# Patient Record
Sex: FEMALE | Race: BLACK OR AFRICAN AMERICAN | Employment: FULL TIME | URBAN - METROPOLITAN AREA
[De-identification: names, ages, dates, MRNs, and addresses within clinical notes are randomized per-mention and may not be internally consistent; named-entity substitution may affect disease eponyms.]

---

## 2017-12-26 ENCOUNTER — HOSPITAL ENCOUNTER (EMERGENCY)
Age: 32
Discharge: HOME | End: 2017-12-26
Attending: EMERGENCY MEDICINE
Payer: COMMERCIAL

## 2017-12-26 ENCOUNTER — APPOINTMENT (OUTPATIENT)
Dept: GENERAL RADIOLOGY | Age: 32
End: 2017-12-26
Attending: PHYSICIAN ASSISTANT
Payer: COMMERCIAL

## 2017-12-26 VITALS
TEMPERATURE: 99.7 F | DIASTOLIC BLOOD PRESSURE: 57 MMHG | HEART RATE: 103 BPM | OXYGEN SATURATION: 99 % | RESPIRATION RATE: 18 BRPM | BODY MASS INDEX: 23.62 KG/M2 | WEIGHT: 165 LBS | HEIGHT: 70 IN | SYSTOLIC BLOOD PRESSURE: 105 MMHG

## 2017-12-26 DIAGNOSIS — J06.9 VIRAL URI WITH COUGH: Primary | ICD-10-CM

## 2017-12-26 DIAGNOSIS — L02.419 AXILLARY ABSCESS: ICD-10-CM

## 2017-12-26 LAB
APPEARANCE UR: CLEAR
BACTERIA URNS QL MICRO: ABNORMAL /HPF
BILIRUB UR QL: NEGATIVE
COLOR UR: YELLOW
EPITH CASTS URNS QL MICRO: ABNORMAL /LPF (ref 0–5)
FLUAV AG NPH QL IA: NEGATIVE
FLUBV AG NOSE QL IA: NEGATIVE
GLUCOSE UR STRIP.AUTO-MCNC: NEGATIVE MG/DL
HCG UR QL: NEGATIVE
HGB UR QL STRIP: NEGATIVE
KETONES UR QL STRIP.AUTO: ABNORMAL MG/DL
LEUKOCYTE ESTERASE UR QL STRIP.AUTO: ABNORMAL
NITRITE UR QL STRIP.AUTO: NEGATIVE
PH UR STRIP: 7 [PH] (ref 5–8)
PROT UR STRIP-MCNC: NEGATIVE MG/DL
RBC #/AREA URNS HPF: ABNORMAL /HPF (ref 0–5)
SP GR UR REFRACTOMETRY: 1.02 (ref 1–1.03)
UROBILINOGEN UR QL STRIP.AUTO: 0.2 EU/DL (ref 0.2–1)
WBC URNS QL MICRO: ABNORMAL /HPF (ref 0–4)

## 2017-12-26 PROCEDURE — 81001 URINALYSIS AUTO W/SCOPE: CPT | Performed by: PHYSICIAN ASSISTANT

## 2017-12-26 PROCEDURE — 96372 THER/PROPH/DIAG INJ SC/IM: CPT

## 2017-12-26 PROCEDURE — 71020 XR CHEST PA LAT: CPT

## 2017-12-26 PROCEDURE — 87804 INFLUENZA ASSAY W/OPTIC: CPT | Performed by: PHYSICIAN ASSISTANT

## 2017-12-26 PROCEDURE — 74011250636 HC RX REV CODE- 250/636: Performed by: PHYSICIAN ASSISTANT

## 2017-12-26 PROCEDURE — 99283 EMERGENCY DEPT VISIT LOW MDM: CPT

## 2017-12-26 PROCEDURE — 81025 URINE PREGNANCY TEST: CPT | Performed by: PHYSICIAN ASSISTANT

## 2017-12-26 RX ORDER — IBUPROFEN 800 MG/1
800 TABLET ORAL
Qty: 20 TAB | Refills: 0 | Status: SHIPPED | OUTPATIENT
Start: 2017-12-26 | End: 2018-01-02

## 2017-12-26 RX ORDER — CODEINE PHOSPHATE AND GUAIFENESIN 10; 100 MG/5ML; MG/5ML
5 SOLUTION ORAL
Qty: 120 ML | Refills: 0 | Status: SHIPPED | OUTPATIENT
Start: 2017-12-26

## 2017-12-26 RX ORDER — SULFAMETHOXAZOLE AND TRIMETHOPRIM 800; 160 MG/1; MG/1
1 TABLET ORAL 2 TIMES DAILY
Qty: 14 TAB | Refills: 0 | Status: SHIPPED | OUTPATIENT
Start: 2017-12-26 | End: 2018-01-02

## 2017-12-26 RX ORDER — KETOROLAC TROMETHAMINE 30 MG/ML
60 INJECTION, SOLUTION INTRAMUSCULAR; INTRAVENOUS
Status: COMPLETED | OUTPATIENT
Start: 2017-12-26 | End: 2017-12-26

## 2017-12-26 RX ORDER — CEPHALEXIN 500 MG/1
500 CAPSULE ORAL 4 TIMES DAILY
Qty: 28 CAP | Refills: 0 | Status: SHIPPED | OUTPATIENT
Start: 2017-12-26 | End: 2018-01-02

## 2017-12-26 RX ADMIN — KETOROLAC TROMETHAMINE 60 MG: 30 INJECTION, SOLUTION INTRAMUSCULAR at 18:38

## 2017-12-26 NOTE — ED PROVIDER NOTES
EMERGENCY DEPARTMENT HISTORY AND PHYSICAL EXAM    5:51 PM      Date: 12/26/2017  Patient Name: Zack Goldman    History of Presenting Illness     Chief Complaint   Patient presents with    Nasal Congestion    Cough    Generalized Body Aches    Abscess       History Provided By: Patient    Chief Complaint: URI, abscess  Duration: 1 Days  Timing:  Acute  Location: URI, Abd, musculoskeletal  Quality: Aching  Severity: 8 out of 10  Modifying Factors:  Associated Symptoms: denies any other associated signs or symptoms      Additional History (Context):Heather Black is a 33 y.o. female who presents to the emergency department for evaluation of body aches, chills, fever, back pain, nasal congestion, rhinorrhea, cough, HA x 1 day. She is also complaining of lower abdominal pain and painful abscess to right axilla. All symptoms began at same time yesterday. Pt denies any dizziness or light headedness, CP or discomfort, SOB, n/v/d/c, diaphoresis, melena/hematochezia, dysuria, hematuria, frequency, vaginal discharge, vaginal odor, new sexual partners, focal weakness/numbness/tingling, or rash. Patient has no other complaints at this time. PCP:  None    Past History     Past Medical History:  No past medical history on file. Past Surgical History:  No past surgical history on file. Family History:  No family history on file. Social History:  Social History   Substance Use Topics    Smoking status: Not on file    Smokeless tobacco: Not on file    Alcohol use Not on file       Allergies:  No Known Allergies      Review of Systems       Review of Systems   Constitutional: Positive for chills and fever. HENT: Positive for congestion and rhinorrhea. Negative for sore throat. Respiratory: Positive for cough. Negative for shortness of breath. Cardiovascular: Negative for chest pain. Gastrointestinal: Positive for abdominal pain.  Negative for blood in stool, constipation, diarrhea, nausea and vomiting. Genitourinary: Negative for dysuria, frequency and hematuria. Musculoskeletal: Positive for back pain and myalgias. Skin: Negative for rash and wound. Neurological: Positive for headaches. Negative for dizziness. Physical Exam     Visit Vitals    /57 (BP 1 Location: Left arm, BP Patient Position: At rest)    Pulse (!) 103    Temp 99.7 °F (37.6 °C)    Resp 18    Ht 5' 10\" (1.778 m)    Wt 74.8 kg (165 lb)    LMP 12/02/2017 (Approximate)    SpO2 99%    BMI 23.68 kg/m2       Physical Exam   Constitutional: She is oriented to person, place, and time. She appears well-developed and well-nourished. No distress. HENT:   Head: Normocephalic and atraumatic. Nose: Mucosal edema and rhinorrhea present. Mouth/Throat: Oropharynx is clear and moist. No oropharyngeal exudate. Eyes: Conjunctivae are normal. Right eye exhibits no discharge. Left eye exhibits no discharge. Neck: Normal range of motion. Neck supple. No thyromegaly present. Cardiovascular: Regular rhythm and normal heart sounds. Exam reveals no gallop and no friction rub. No murmur heard. Pulmonary/Chest: Effort normal and breath sounds normal. No respiratory distress. She has no wheezes. She has no rales. She exhibits no tenderness. Lungs CTAB   Abdominal: Soft. Bowel sounds are normal. She exhibits no distension. There is tenderness. There is no rebound and no guarding. Tenderness to deep palpation noted to all regions. No rebound, rigidity, guarding, distention, or mass noted. (-) Beavers's sign. (-) Rovsing's sign. Normoactive BS all four quadrants. Musculoskeletal: She exhibits no edema or deformity. 1x1.5cm indurated region noted to right axilla without fluctuance. No surrounding erythema   Lymphadenopathy:     She has no cervical adenopathy. Neurological: She is alert and oriented to person, place, and time. She has normal reflexes. No cranial nerve deficit. Skin: Skin is warm and dry.  No rash noted. She is not diaphoretic. Psychiatric: She has a normal mood and affect. Nursing note and vitals reviewed. Diagnostic Study Results     Labs -  Recent Results (from the past 12 hour(s))   INFLUENZA A & B AG (RAPID TEST)    Collection Time: 12/26/17  5:48 PM   Result Value Ref Range    Influenza A Antigen NEGATIVE  NEG      Influenza B Antigen NEGATIVE  NEG     URINALYSIS W/ RFLX MICROSCOPIC    Collection Time: 12/26/17  6:00 PM   Result Value Ref Range    Color YELLOW      Appearance CLEAR      Specific gravity 1.016 1.005 - 1.030      pH (UA) 7.0 5.0 - 8.0      Protein NEGATIVE  NEG mg/dL    Glucose NEGATIVE  NEG mg/dL    Ketone TRACE (A) NEG mg/dL    Bilirubin NEGATIVE  NEG      Blood NEGATIVE  NEG      Urobilinogen 0.2 0.2 - 1.0 EU/dL    Nitrites NEGATIVE  NEG      Leukocyte Esterase TRACE (A) NEG     HCG URINE, QL    Collection Time: 12/26/17  6:00 PM   Result Value Ref Range    HCG urine, Ql. NEGATIVE  NEG     URINE MICROSCOPIC ONLY    Collection Time: 12/26/17  6:00 PM   Result Value Ref Range    WBC 0 to 3 0 - 4 /hpf    RBC NONE 0 - 5 /hpf    Epithelial cells FEW 0 - 5 /lpf    Bacteria 1+ (A) NEG /hpf       Radiologic Studies -   No results found. Medical Decision Making   I am the first provider for this patient. I reviewed the vital signs, available nursing notes, past medical history, past surgical history, family history and social history. Vital Signs-Reviewed the patient's vital signs.     Pulse Oximetry Analysis -  99% on room air (Interpretation)    Records Reviewed: Nursing Notes, Old Medical Records, Previous Radiology Studies and Previous Laboratory Studies (Time of Review: 5:51 PM)    ED Course: Progress Notes, Reevaluation, and Consults:    Provider Notes (Medical Decision Making):   Differential Diagnosis:  influenza, mononucleosis, acute bronchitis, URI, streptococcal pharyngitis, pertussis, pneumonia, asthma exacerbation, allergic rhinitis      Plan:  Pt presents ambulatory in NAD, mildly tachycardic with otherwise unremarkable vitals. CXR negative. Flu negative. UA and hcg negative. Likely viral etiology. Abscess is too small to I&D and there is no fluctuance. Will treat with abx and robitussin AC. At this time, patient is stable and appropriate for discharge home. Patient demonstrates understanding of current diagnoses and is in agreement with the treatment plan. They are advised that while the likelihood of serious underlying condition is low at this point given the evaluation performed today, we cannot fully rule it out. They are advised to immediately return with any new symptoms or worsening of current condition. All questions have been answered. Patient is given educational material regarding their diagnoses, including danger symptoms and when to return to the ED. Diagnosis     Clinical Impression:   1. Viral URI with cough    2. Axillary abscess        Disposition: IN Home    Follow-up Information     Follow up With Details Comments Alisa Call in 2 days to establish primary care [facility] EMERGENCY DEPT Go to As needed, If symptoms worsen [id number] [facility]  [phone]           Patient's Medications   Start Taking    CEPHALEXIN (KEFLEX) 500 MG CAPSULE    Take 1 Cap by mouth four (4) times daily for 7 days. GUAIFENESIN-CODEINE (ROBITUSSIN AC) 100-10 MG/5 ML SOLUTION    Take 5 mL by mouth nightly as needed for Cough. Max Daily Amount: 5 mL. IBUPROFEN (MOTRIN) 800 MG TABLET    Take 1 Tab by mouth every six (6) hours as needed for Pain for up to 7 days. TRIMETHOPRIM-SULFAMETHOXAZOLE (BACTRIM DS) 160-800 MG PER TABLET    Take 1 Tab by mouth two (2) times a day for 7 days.    Continue Taking    No medications on file   These Medications have changed    No medications on file   Stop Taking    No medications on file     _______________________________

## 2017-12-27 NOTE — DISCHARGE INSTRUCTIONS
Skin Abscess: Care Instructions  Your Care Instructions    A skin abscess is a bacterial infection that forms a pocket of pus. A boil is a kind of skin abscess. The doctor may have cut an opening in the abscess so that the pus can drain out. You may have gauze in the cut so that the abscess will stay open and keep draining. You may need antibiotics. You will need to follow up with your doctor to make sure the infection has gone away. The doctor has checked you carefully, but problems can develop later. If you notice any problems or new symptoms, get medical treatment right away. Follow-up care is a key part of your treatment and safety. Be sure to make and go to all appointments, and call your doctor if you are having problems. It's also a good idea to know your test results and keep a list of the medicines you take. How can you care for yourself at home? · Apply warm and dry compresses, a heating pad set on low, or a hot water bottle 3 or 4 times a day for pain. Keep a cloth between the heat source and your skin. · If your doctor prescribed antibiotics, take them as directed. Do not stop taking them just because you feel better. You need to take the full course of antibiotics. · Take pain medicines exactly as directed. ¨ If the doctor gave you a prescription medicine for pain, take it as prescribed. ¨ If you are not taking a prescription pain medicine, ask your doctor if you can take an over-the-counter medicine. · Keep your bandage clean and dry. Change the bandage whenever it gets wet or dirty, or at least one time a day. · If the abscess was packed with gauze:  ¨ Keep follow-up appointments to have the gauze changed or removed. If the doctor instructed you to remove the gauze, gently pull out all of the gauze when your doctor tells you to. ¨ After the gauze is removed, soak the area in warm water for 15 to 20 minutes 2 times a day, until the wound closes. When should you call for help?   Call your doctor now or seek immediate medical care if:  ? · You have signs of worsening infection, such as:  ¨ Increased pain, swelling, warmth, or redness. ¨ Red streaks leading from the infected skin. ¨ Pus draining from the wound. ¨ A fever. ? Watch closely for changes in your health, and be sure to contact your doctor if:  ? · You do not get better as expected. Where can you learn more? Go to http://cliff-raisa.info/. Enter L294 in the search box to learn more about \"Skin Abscess: Care Instructions. \"  Current as of: October 13, 2016  Content Version: 11.4  © 0401-6339 [employer]. Care instructions adapted under license by [employer] (which disclaims liability or warranty for this information). If you have questions about a medical condition or this instruction, always ask your healthcare professional. [place] [address] any warranty or liability for your use of this information. Viral Respiratory Infection: Care Instructions  Your Care Instructions    Viruses are very small organisms. They grow in number after they enter your body. There are many types that cause different illnesses, such as colds and the mumps. The symptoms of a viral respiratory infection often start quickly. They include a fever, sore throat, and runny nose. You may also just not feel well. Or you may not want to eat much. Most viral respiratory infections are not serious. They usually get better with time and self-care. Antibiotics are not used to treat a viral infection. That's because antibiotics will not help cure a viral illness. In some cases, antiviral medicine can help your body fight a serious viral infection. Follow-up care is a key part of your treatment and safety. Be sure to make and go to all appointments, and call your doctor if you are having problems. It's also a good idea to know your test results and keep a list of the medicines you take.   How can you care for yourself at home? · Rest as much as possible until you feel better. · Be safe with medicines. Take your medicine exactly as prescribed. Call your doctor if you think you are having a problem with your medicine. You will get more details on the specific medicine your doctor prescribes. · Take an over-the-counter pain medicine, such as acetaminophen (Tylenol), ibuprofen (Advil, Motrin), or naproxen (Aleve), as needed for pain and fever. Read and follow all instructions on the label. Do not give aspirin to anyone younger than 20. It has been linked to Reye syndrome, a serious illness. · Drink plenty of fluids, enough so that your urine is light yellow or clear like water. Hot fluids, such as tea or soup, may help relieve congestion in your nose and throat. If you have kidney, heart, or liver disease and have to limit fluids, talk with your doctor before you increase the amount of fluids you drink. · Try to clear mucus from your lungs by breathing deeply and coughing. · Gargle with warm salt water once an hour. This can help reduce swelling and throat pain. Use 1 teaspoon of salt mixed in 1 cup of warm water. · Do not smoke or allow others to smoke around you. If you need help quitting, talk to your doctor about stop-smoking programs and medicines. These can increase your chances of quitting for good. To avoid spreading the virus  · Cough or sneeze into a tissue. Then throw the tissue away. · If you don't have a tissue, use your hand to cover your cough or sneeze. Then clean your hand. You can also cough into your sleeve. · Wash your hands often. Use soap and warm water. Wash for 15 to 20 seconds each time. · If you don't have soap and water near you, you can clean your hands with alcohol wipes or gel. When should you call for help? Call your doctor now or seek immediate medical care if:  ? · You have a new or higher fever. ? · Your fever lasts more than 48 hours.    ? · You have trouble breathing. ? · You have a fever with a stiff neck or a severe headache. ? · You are sensitive to light. ? · You feel very sleepy or confused. ? Watch closely for changes in your health, and be sure to contact your doctor if:  ? · You do not get better as expected. Where can you learn more? Go to http://cliff-raisa.info/. Enter G287 in the search box to learn more about \"Viral Respiratory Infection: Care Instructions. \"  Current as of: May 12, 2017  Content Version: 11.4  © 3085-5133 [employer]. Care instructions adapted under license by [employer] (which disclaims liability or warranty for this information). If you have questions about a medical condition or this instruction, always ask your healthcare professional. [address] any warranty or liability for your use of this information.

## 2017-12-27 NOTE — ED NOTES
Trudy Hung is a 33 y.o. female that was discharged in stable. Pt was accompanied by sister . Pt is not driving. The patients diagnosis, condition and treatment were explained to  patient and aftercare instructions were given. The patient verbalized understanding. Patient armband removed and shredded.

## 2018-09-28 ENCOUNTER — HOSPITAL ENCOUNTER (INPATIENT)
Dept: HOSPITAL 74 - JLDR | Age: 33
LOS: 7 days | Discharge: HOME | End: 2018-10-05
Attending: OBSTETRICS & GYNECOLOGY | Admitting: OBSTETRICS & GYNECOLOGY
Payer: COMMERCIAL

## 2018-09-28 VITALS — BODY MASS INDEX: 27.8 KG/M2

## 2018-09-28 DIAGNOSIS — D64.9: ICD-10-CM

## 2018-09-28 DIAGNOSIS — O99.013: ICD-10-CM

## 2018-09-28 DIAGNOSIS — D25.9: ICD-10-CM

## 2018-09-28 DIAGNOSIS — O32.0XX0: ICD-10-CM

## 2018-09-28 DIAGNOSIS — O34.83: ICD-10-CM

## 2018-09-28 DIAGNOSIS — N83.8: ICD-10-CM

## 2018-09-28 DIAGNOSIS — Z3A.39: ICD-10-CM

## 2018-09-28 DIAGNOSIS — O34.13: ICD-10-CM

## 2018-10-01 LAB
ARTERIAL BLOOD GAS PCO2: 53.5 MMHG (ref 35–45)
BASE EXCESS BLDA CALC-SCNC: -5.4 MEQ/L (ref -2–2)
PH BLDV: 7.23 [PH] (ref 7.32–7.42)
PO2 BLDA: 18.1 MMHG (ref 80–100)
SAO2 % BLDA: 36.1 % (ref 90–98.9)
VENOUS PC02: 54 MMHG (ref 38–52)
VENOUS PO2: 18.5 MMHG (ref 28–48)

## 2018-10-01 PROCEDURE — 0UB90ZZ EXCISION OF UTERUS, OPEN APPROACH: ICD-10-PCS | Performed by: RADIOLOGY

## 2018-10-01 RX ADMIN — CEFAZOLIN SCH MLS/HR: 1 INJECTION, POWDER, FOR SOLUTION INTRAVENOUS at 18:01

## 2018-10-01 RX ADMIN — IBUPROFEN PRN MG: 800 INJECTION INTRAVENOUS at 14:43

## 2018-10-01 NOTE — HP
Past Medical History





- Primary Care Physician


PCP:: Mynor Feldman





- Admission


Chief Complaint: 34yo P0 with pregnancy at EGA 39w3d and large fibroids with 

unstable fetal lie admitted for preimary C/S.


History of Present Illness: 





Pt with multiple large fibroids, including a large FLOWER anterior fibroid, 

preventing fetal descent.


Prior h/o anemia- corrected.


H/o Endometrioma and endometriosis


History Source: Patient, Medical Record


Limitations to Obtaining History: No Limitations





- Past Medical History


CNS: No: Alzheimer's, CVA, Dementia, Migraine, Multiple Sclerosis, Peripheral 

Neuropathy, Parkinson's, Seizure, Syncope, TIA, Vertigo, Other


Cardiovascular: No: AFIB, Aneurysm, Aortic Insufficiency, Aortic Stenosis, CAD, 

CHF, Deep Vein Thrombosis, HTN, Hyperlipdemia, MI, Mitral Insufficiency, Mitral 

Stenosis, Murmur, Pulmonary Hypertension, Other


Pulmonary: No: Asthma, Bronchitis, Cancer, COPD, O2 Dependent, Pneumonia, 

Previously Intubated, Pulmonary Embolus, Pulmonary Fibrosis, Sleep Apnea, Other


Gastrointestinal: No: Ascites, Cancer, Constipation, Crohn's Disease, 

Diverticulitis, Diverticulosis, Esophageal Varices, Gastritis, GERD, GI Bleed, 

Hemorrhoids, Hiatal Hernia, Inflamatory Bowel Disease, Irritable Bowel Disease, 

Pancreatitis, Peptic Ulcer Disease, Ulcerative Colitis, Other


Hepatobiliary: No: Cirrhosis, Cholelithiasis, Cholecystitis, Choledocholithiasis

, Hepatitis A, Hepatitis B, Hepatitis C, Other


Renal/: No: Renal Failure, Renal Inusuff, BPH, Cancer, Hematuria, Hemodialysis

, Neurogenic Bladder, Renal Calculi, UTI, Other


Reproductive: Yes: Fibroids


...Para: 0


... Weeks Gestation by Dates: 39.3


Heme/Onc: Yes: Anemia


Infectious Disease: No: AIDS, C-Diff, Herpes Zoster, HIV, MRSA, STD's, 

Tuberculosis, VREF, Other


Psych: No: Addictions, Anxiety, Bipolar, Depression, Panic, Psychosis, 

Schizophrenia, Other


Musculoskeletal: No: Bursitis, Chronic low back pain, Hemiparesis, Hemiplegia, 

Osteoarthritis, Paraplegia, Other


Rheumatology: No: Fibromyalgia, Gout, Lupus, Rheumatoid Arthritis, Sarcoidosis, 

Vasculitis, Other


ENT: No: Allergic Rhinitis, Sinusitis, Other


Endocrine: No: Davis's Disease, Cushing's Disease, Diabetes Insipidus, 

Diabetes Mellitus, Hyperparathyroidism, Hyperthyroidism, Hypothyroidism, 

Osteopenia, SIADH, Other


Dermatology: No: Basal Cell, Cellulitis, Eczema, Melanoma, Psoriasis, Squamous 

Cell, Other





- Past Surgical History


Hx Myomectomy: No


Hx Transabdominal Cerclage: No


Additional Surgical History: Laparoscopic left ovarian cystectomy, ablation of 

endometrium, MERRY (3/2015).  Breast Bx





- Smoking History


Smoking history: Never smoked





- Alcohol/Substance Use


Hx Alcohol Use: No


History of Substance Use: reports: None





- Social History


Usual Living Arrangement: Yes: Alone


ADL: Independent


Occupation: Teacher


History of Recent Travel: No





Home Medications





- Allergies


Allergies/Adverse Reactions: 


 Allergies











Allergy/AdvReac Type Severity Reaction Status Date / Time


 


soy Allergy Severe THROAT ITCH Verified 07/05/18 10:39


 


NUTS Allergy Severe SWELLING, Uncoded 07/05/18 10:39





   DIFF  





   BREATHING  














- Home Medications


Home Medications: 


Ambulatory Orders





Iron Sucrose Injection [Venofer Injection -] 1 infus.bot IVPB WEEKLY 07/05/18 


Prenatal Vitamins (Sjr) - 1 tab PO DAILY 07/05/18 











Family Disease History





- Family Disease History


Family History: Unremarkable





Review of Systems





- Review of Systems


Constitutional: reports: No Symptoms


Eyes: reports: No Symptoms


HENT: reports: No Symptoms


Neck: reports: No Symptoms


Cardiovascular: reports: No Symptoms


Respiratory: reports: No Symptoms


Gastrointestinal: reports: No Symptoms


Genitourinary: reports: No Symptoms


Breasts: reports: No Symptoms Reported


Musculoskeletal: reports: No Symptoms


Integumentary: reports: No Symptoms


Neurological: reports: No Symptoms


Endocrine: reports: No Symptoms


Hematology/Lymphatic: reports: No Symptoms


Pain Intensity: 0





Physical Exam - Maternity


Constitutional: Yes: Well Nourished, No Distress, Calm


Eyes: Yes: WNL, Conjunctiva Clear


HENT: Yes: WNL, Atraumatic, Normocephalic


Neck: Yes: WNL, Supple, Trachea Midline


Cardiovascular: Yes: WNL, Regular Rate and Rhythm


Lungs: Clear to auscultation, Normal air movement





- Abdominal Exam/OB


Fundal Height: 40


Number of Fetuses: Single


Fetal Presentation: Vertex, Oblique


Contractions: No


Fetal Heart Rate (range): 130


Fetal Heart Rate Location: Midline


Category: I


Accelerations: Non-Uniform


Decelerations: None





- Vaginal Exam/OB


Vaginal Bleediing: No


Speculum Exam: No





- Physical Exam


Musculoskeletal: Yes: WNL


Extremities: Yes: WNL


Edema: No


Integumentary: Yes: WNL


Deep Tendon Reflex Grade: Normal +2


...Motor Strength: WNL


Psychiatric: Yes: WNL, Alert, Oriented





Hemorrhage Risk Assessment





- Risk Factors


Medium Risk Factors: Yes: Large myomas


High Risk Factors: Yes: None


Risk Score: 1


Risk Level: Medium Risk





Imaging





- Results


Ultrasound: Report Reviewed, Image Reviewed





Assessment/Plan





34yo P0 with pregnancy at EGA 39w3d and large fibroids with unstable fetal lie 

admitted for preimary C/S. We discussed the risks and benefits of C/S at length

, including but not limited to scarring, pain, bleeding, infection, injury to 

underlying organs and structures, need for additional surgery to repair/treat 

any problems or complications, fetal complications/injuries, etc. We 

specifically reviewed the risks of alternative uterine incisions, hemorrhage, 

hysterectomy, blood transfusion, etc. The pt verbalized her understanding and 

requested to proceed with surgery. The pt is aware that all surgeries have 

risks and no guarantees can be provided.

## 2018-10-01 NOTE — OP
Operative Note





- Note:


Operative Date: 10/01/18


Pre-Operative Diagnosis: Pregnancy at EGA 39w2d.  Large fibroid uterus.  

Unstable fetal lie


Operation: Primary high transverse C/S.  Myomectomy.  Excision of Left 

paratubal cyst


Findings: 





1. Bulky gravid uterus with multiple fibroids


2. Large FLOWER anterior fibroid (replacing FLOWER)


3. Several smaller uterine fibroids throughout


4. Left paratubal/para-ovarian cyst


5. Ovaries difficult to see but appeared normal.


Post-Operative Diagnosis: Same as Pre-op


Surgeon: Mynor Feldman


Assistant: Jersey Sanchez


Anesthesiologist/CRNA: Montserrat Roland


Anesthesia: Spinal, Epidural


Specimens Removed: Placenta.  Uterine myoma.  left paratubal cyst


Estimated Blood Loss (mls): 1,000


Drains & Tubes with Location: [place] Cath


Drains, Volume Out (mls): 400


Blood Volume Replaced (mls): 0


Fluid Volume Replaced (mls): 1,000


Operative Report Dictated: Yes

## 2018-10-01 NOTE — OP
DATE OF OPERATION:  10/01/2018

 

PREOPERATIVE DIAGNOSIS:  Pregnancy at estimated gestational age of 39 weeks 2 
days,

large fibroid uterus, unstable fetal lie.

 

POSTOPERATIVE DIAGNOSIS:  Pregnancy at estimated gestational age of 39 weeks 2 
days,

large fibroid uterus, unstable fetal lie, delivered.

 

PROCEDURE:  Primary high transverse  section, myomectomy, excision of 
left

paratubal/paraovarian cyst.  

 

SURGEON:  Mynor Feldman MD 

 

ASSISTANT:  Jersey Sanchez MD 

 

ANESTHESIOLOGIST:  Montserrat Roland MD 

 

ANESTHESIA:  Spinal epidural.

 

COMPLICATIONS:  None.

 

ESTIMATED BLOOD LOSS:  1000 mL.

 

URINE OUTPUT:  400 mL of clear urine at the end of the procedure.

 

IV FLUIDS:  1000 mL.

 

PATHOLOGY:  Placenta, uterine myoma, left paratubal/paraovarian cyst.

 

FINDINGS:  Examination prior to  section revealed an enlarged, bulky, 
gravid

uterus with multiple fibroids.  A large fibroid was palpable on a vaginal exam 
on the

lower uterine segment anteriorly.  Fetal head was not palpable on the vaginal 
exam. 

On Leopold's maneuver, the fetal head was noted to be on the left maternal side 
in

the left lower quadrant.  During surgery, a bulky, gravid uterus was noted with

multiple fibroids.  There was a large anterior fibroid in the lower uterine 
segment

underneath the bladder completely replacing and displacing lower uterine 
segment. 

There were multiple small uterine fibroids throughout the uterus.  Live  
was

noted to be in oblique presentation with the head maternal left delivered from 
vertex

presentation.  No meconium in amniotic fluid.  Apgars 9/9.  The ovaries were

difficult to visualize, however, appeared to be within normal limits.

 

DESCRIPTION OF PROCEDURE:  The patient was met preoperatively.  Risks, benefits
, and

alternatives of surgery were reviewed in detail.  All questions were answered.  
The

patient was then brought to the OR with the IV running.  She was placed on the

surgical table in a sitting position.  Epidural anesthesia was achieved without

difficulty.  The patient was then placed in a supine position with a leftward 
tilt. 

The patient was then examined under anesthesia with the findings as described 
above. 

A Mendoza catheter was introduced inside the bladder and left to drain to 
gravity.  The

patient was then prepped and draped in a usual sterile fashion.  A time-out 
procedure

was conducted as per standard protocol.  The surgeons then proceeded with the

operation.  A transverse lower abdominal incision was made with a knife.  The

incision was carried down to the level of the fascia.  Good hemostasis was

maintained.  The fascia was incised in the midline, and the incision was 
extended

bilaterally using Dutta scissors.  The decision was made to proceed with a 
Maylard

abdominal incision.  The rectus muscles were then transected using a cautery 
device. 

The inferior epigastric blood vessels were isolated, clamped, cut, and suture 
ligated

bilaterally with good hemostasis.  The peritoneum was identified and entered 
sharply.

 The peritoneal incision was then extended bilaterally using Metzenbaum 
scissors.  At

that point, the retractors were used to visualize the uterus.  The lower uterine

segment was replaced by a large anterior fibroid underneath the bladder.  The

decision was then made to proceed with the high transverse incision to avoid 
multiple

other fibroids in the uterus.  The uterine incision was made transversely with a

knife.  The incision was carried down to the amniotic cavity.  Once the uterine

cavity was entered, the uterine incision was extended transversely using bandage

scissors.  The baby was delivered from vertex presentation.  The mouth and nose 
were

suctioned.  The baby was delivered without complications.  The baby was crying

spontaneously.  The umbilical cord was clamped and cut, and the baby was handed 
to

the awaiting neonatologist.  The baby was delivered manually without 
complications. 

The uterine cavity was cleared of all clots and debris using laparotomy laps.  
The

uterine myometrium was then repaired using a 0 Biosyn suture in multiple 
layers. 

Once the uterine myometrium was repaired, good hemostasis was observed.  

In order to close the uterine incision, a 4-cm myoma had to be removed to allow 
for better 

approximation and hemostasis.  A myomectomy was performed without 
complications.The top

layer and uterine serosa were closed using a running Biosyn suture with good

hemostasis and approximation. The operative site was irrigated with copious

amounts of normal saline.  Normal saline was aspirated, and good hemostasis was

confirmed.  Examination of fallopian tubes revealed a left paratubal/paraovarian

cyst.  The cyst was excised with good hemostasis.  The ovaries were difficult to

visualize because they were displaced by multiple fibroids; however, the ovaries

appeared to be within normal limits.  Once again, the operative site was 
surveyed and

noted to have good hemostasis.  The abdominal peritoneum was then closed using 
a 2-0

chromic suture.  The fascia was then closed using a 0 Vicryl suture with good

hemostasis and approximation.  The subcutaneous tissues and Amanda fascia were

approximated using several interrupted 0 Vicryl sutures.  The skin was closed 
using a

4-0 Vicryl suture with a subcutaneous stitch.  Sponge, lap, and instrument 
counts

were correct.  The patient was tolerated the procedure well and was transferred 
to

the recovery room in stable condition and awake.

 

 

 

PASCUAL [id number]

DD: 10/01/2018 12:38

DT: 10/01/2018 20:22

Job #:  [id number]

MTDD

## 2018-10-02 LAB
BASOPHILS # BLD: 0.2 % (ref 0–2)
DEPRECATED RDW RBC AUTO: 14.6 % (ref 11.6–15.6)
EOSINOPHIL # BLD: 1.9 % (ref 0–4.5)
HCT VFR BLD CALC: 30.4 % (ref 32.4–45.2)
HGB BLD-MCNC: 10.2 GM/DL (ref 10.7–15.3)
LYMPHOCYTES # BLD: 7.7 % (ref 8–40)
MCH RBC QN AUTO: 30.7 PG (ref 25.7–33.7)
MCHC RBC AUTO-ENTMCNC: 33.4 G/DL (ref 32–36)
MCV RBC: 91.6 FL (ref 80–96)
MONOCYTES # BLD AUTO: 7 % (ref 3.8–10.2)
NEUTROPHILS # BLD: 83.2 % (ref 42.8–82.8)
PLATELET # BLD AUTO: 134 K/MM3 (ref 134–434)
PMV BLD: 9.2 FL (ref 7.5–11.1)
RBC # BLD AUTO: 3.32 M/MM3 (ref 3.6–5.2)
WBC # BLD AUTO: 9.5 K/MM3 (ref 4–10)

## 2018-10-02 RX ADMIN — CEFAZOLIN SCH MLS/HR: 1 INJECTION, POWDER, FOR SOLUTION INTRAVENOUS at 01:08

## 2018-10-02 RX ADMIN — SIMETHICONE CHEW TAB 80 MG PRN MG: 80 TABLET ORAL at 15:38

## 2018-10-02 RX ADMIN — CEFAZOLIN SCH MLS/HR: 1 INJECTION, POWDER, FOR SOLUTION INTRAVENOUS at 09:11

## 2018-10-02 RX ADMIN — IBUPROFEN PRN MG: 800 INJECTION INTRAVENOUS at 04:19

## 2018-10-02 RX ADMIN — Medication SCH TAB: at 10:00

## 2018-10-02 RX ADMIN — ACETAMINOPHEN PRN MG: 325 TABLET ORAL at 15:37

## 2018-10-02 RX ADMIN — IBUPROFEN PRN MG: 600 TABLET, FILM COATED ORAL at 15:38

## 2018-10-02 NOTE — PN
Progress Note, Physician


Chief Complaint: 





s/p c section under spinal anesthesia.  epidural catheter placed as well


History of Present Illness: 





post op day one. it was not clear whether the epidural was functioning properly 

during the surgery, had to give deep sedation at the end of the surgery.  





- Current Medication List


Current Medications: 


Active Medications





Acetaminophen (Tylenol -)  650 mg PO Q4H PRN


   PRN Reason: FEVER


Benzocaine (Americaine 20% Spray -)  1 spray TP PRN PRN


   PRN Reason: Pain - Topical


Bisacodyl (Dulcolax Suppository -)  10 mg RC PRN PRN


   PRN Reason: CONSTIPATION


Diphenhydramine HCl (Benadryl Injection -)  25 mg IVPUSH Q4H PRN


   PRN Reason: Pruritis


Parenteral Electrolytes (Plasma-Lyte 148 -)  1,000 mls @ 125 mls/hr IV ASDIR [facility]


   Last Admin: 10/01/18 08:30 Dose:  125 mls/hr


Cefazolin Sodium 1 gm/ (Dextrose)  50 mls @ 100 mls/hr IVPB Q8H-IV [facility]INI


   Stop: 10/02/18 10:29


   Last Admin: 10/02/18 01:08 Dose:  100 mls/hr


Oxytocin/Sodium Chloride (Normal Saline+20 Units Oxytocin -)  20 unit in 1,000 

mls @ 125 mls/hr IV ASDIR [facility]


Ibuprofen (Motrin -)  600 mg PO Q4H PRN


   PRN Reason: PAIN LEVEL 1 - 3


Ibuprofen (Caldolor Injection -)  800 mg IVPB Q8H PRN


   PRN Reason: PAIN LEVEL 1-5


   Last Admin: 10/02/18 04:19 Dose:  800 mg


Methylergonovine Maleate (Methergine Injection -)  0.2 mg IM Q4H PRN


   PRN Reason: Excessive Bleeding (L&D)


Ondansetron HCl (Zofran Injection)  4 mg IVPUSH Q6H PRN


   PRN Reason: NAUSEA AND/OR VOMITING


Ondansetron HCl (Zofran Injection)  4 mg IVPUSH Q4H PRN


   PRN Reason: NAUSEA


Oxycodone HCl (Roxicodone -)  5 mg PO Q4H PRN


   PRN Reason: PAIN LEVEL 4 - 6


Prenatal Multivit/Folic Acid/Iron (Prenatal Vitamins (Sjr) -)  1 tab PO DAILY 

[facility]


Senna/Docusate Sodium (Pericolace -)  2 tablet PO HS PRN


   PRN Reason: CONSTIPATION


Simethicone (Mylicon -)  80 mg PO Q4H PRN


   PRN Reason: GAS


Witch Hazel/Glycerin (Tucks Pads -)  1 pad TP PRN PRN


   PRN Reason: Pain - Topical











- Objective


Vital Signs: 


 Vital Signs











Temperature  98 F   10/02/18 06:00


 


Pulse Rate  87   10/02/18 06:00


 


Respiratory Rate  20   10/02/18 06:00


 


Blood Pressure  104/52 L  10/02/18 06:00


 


O2 Sat by Pulse Oximetry (%)  100   10/01/18 13:15











Constitutional: Yes: Well Nourished


Cardiovascular: Yes: WNL


Respiratory: Yes: WNL


Gastrointestinal: Yes: WNL


Labs: 


 CBC, BMP





 10/02/18 07:00 











Assessment/Plan





Complained of some mild nausea overnight, now resolved, pain controlled, 

ambulating, no adverse effect of anesthetic, dept of anesthesia will sign off 

case at this time

## 2018-10-02 NOTE — PN
Post Partum Progress Note





- Subjective


Subjective: 





Patient without acute complaints. 


Reports tolerating oral intake without nausea or vomiting. 


Ambulating without dizziness. 


Denies fevers or chills.


Pain well controlled with oral pain medication.


Not Passing flatus and did not void yet.


Post Partum Day: 1


Type of Delivery: Primary C/S


Vital Signs: 


 Vital Signs











Temperature  97.0 F L  10/02/18 10:00


 


Pulse Rate  88   10/02/18 10:00


 


Respiratory Rate  20   10/02/18 10:00


 


Blood Pressure  102/50 L  10/02/18 10:00


 


O2 Sat by Pulse Oximetry (%)  100   10/01/18 13:15











Breast Exam: Yes: Soft


Uterus: Yes: Fundus Firm, Fundus above umbilicus


Incision: Yes: Dressing dry and intact


Abdomen/GI: Yes: Abdomen soft


Lochia: Yes: Rubra


Lochia, amount: Small


Extremities: Yes: Calves non-tender





- Labs


Labs: 


 CBC











WBC  9.5 K/mm3 (4.0-10.0)   10/02/18  07:00    


 


RBC  3.32 M/mm3 (3.60-5.2)  L  10/02/18  07:00    


 


Hgb  10.2 GM/dL (10.7-15.3)  L  10/02/18  07:00    


 


Hct  30.4 % (32.4-45.2)  L D 10/02/18  07:00    


 


MCV  91.6 fl (80-96)   10/02/18  07:00    


 


MCH  30.7 pg (25.7-33.7)   10/02/18  07:00    


 


MCHC  33.4 g/dl (32.0-36.0)   10/02/18  07:00    


 


RDW  14.6 % (11.6-15.6)   10/02/18  07:00    


 


Plt Count  134 K/MM3 (134-434)   10/02/18  07:00    


 


MPV  9.2 fl (7.5-11.1)   10/02/18  07:00    


 


Absolute Neuts (auto)  7.9 K/mm3 (1.5-8.0)   10/02/18  07:00    


 


Neutrophils %  83.2 % (42.8-82.8)  H  10/02/18  07:00    


 


Lymphocytes %  7.7 % (8-40)  L D 10/02/18  07:00    


 


Monocytes %  7.0 % (3.8-10.2)   10/02/18  07:00    


 


Eosinophils %  1.9 % (0-4.5)  D 10/02/18  07:00    


 


Basophils %  0.2 % (0-2.0)   10/02/18  07:00    


 


Nucleated RBC %  0 % (0-0)   10/02/18  07:00    














Assessment/Plan





32yo P 1 now s/p high transverse c/s and myomectomy due to large fibroids, 

fetal malpresentation


1. Doing well 


2. VSS, Afebrile, no evidance of acute blood loss


3. Rh positive status, no rhogam indicated. 


4. Encourage ambulation 


5. [place] Insentive spirometer


6. Will follow voiding trial


7. Continue routine prenatal care

## 2018-10-03 RX ADMIN — Medication SCH TAB: at 10:50

## 2018-10-03 RX ADMIN — SIMETHICONE CHEW TAB 80 MG PRN MG: 80 TABLET ORAL at 08:01

## 2018-10-03 RX ADMIN — IBUPROFEN PRN MG: 600 TABLET, FILM COATED ORAL at 18:18

## 2018-10-03 RX ADMIN — IBUPROFEN PRN MG: 600 TABLET, FILM COATED ORAL at 08:01

## 2018-10-03 RX ADMIN — ACETAMINOPHEN PRN MG: 325 TABLET ORAL at 08:01

## 2018-10-03 RX ADMIN — ACETAMINOPHEN PRN MG: 325 TABLET ORAL at 18:17

## 2018-10-03 RX ADMIN — SIMETHICONE CHEW TAB 80 MG PRN MG: 80 TABLET ORAL at 18:17

## 2018-10-03 NOTE — PATH
Surgical Pathology Report



Patient Name:  DEEPIKA TOPETE

Accession #:  [id number]

[facility]. Rec. #:  [id number]                                                        

   /Age/Gender:  1985 (Age: 33) / F

Account:  [id number]                                                          

             Location: [facility] OBS/GYN

Taken:  10/1/2018

Received:  10/2/2018

Reported:  10/3/2018

Physicians:  Mynor Feldman M.D.

  



Specimen(s) Received

 PLACENTA 





Clinical History

, 39.4 weeks, history of HPV, large uterine fibroids

Previous surgery: Endometriosis with ablation, lysis of adhesions, left ovarian

cystectomy 3/2016, left knee surgery 2001







Final Diagnosis

PLACENTA,  SECTION:

553 G THIRD TRIMESTER PLACENTA WITH TRIVASCULAR UMBILICAL CORD AND FOCAL ACUTE

MILD CHORIOAMNIONITIS. 





***Electronically Signed***

Jelly Tobar M.D.





Gross Description

The specimen is received fresh labeled "placenta" is a 553 gram, 17 x 17 x 2 cm.

placenta with attached membranes and umbilical cord.  The attached membranes are

yellow-tinged, opaque and insert marginally.  The umbilical cord measures 35 cm

in length and averages 1.5 cm in diameter.  The cord inserts marginally.  No

true knots or strictures are identified. Cut surface of the umbilical cord

reveals 3 vessels. The fetal surface is grey-blue with minimal fibrin deposition

and appropriate caliber vessels.  The maternal surface is red-brown with focal

defects.  Sectioning reveals red-brown, spongy parenchyma.  No lesions are

identified.  Representative sections are submitted in three cassettes as

follows: 1- membrane rolls and umbilical cord; 2-3- full thickness sections of

placenta.

MLSZ/10/2/2018



sanml/10/2/2018

## 2018-10-03 NOTE — PN
Post Partum Progress Note





- Subjective


Subjective: 





No complaints.


Ambulating.


No nausea or vomiting. Passing flatus. 


Type of Delivery: Primary C/S


Vital Signs: 


 Vital Signs











Temperature  98.4 F   10/02/18 21:00


 


Pulse Rate  81   10/02/18 21:00


 


Respiratory Rate  20   10/02/18 21:00


 


Blood Pressure  112/58 L  10/02/18 21:00


 


O2 Sat by Pulse Oximetry (%)  100   10/01/18 13:15











Breast Exam: Yes: Soft


Uterus: Yes: Fundus Firm


Incision: Yes: Sutures intact


Abdomen/GI: Yes: Abdomen soft, Tolerating PO


Lochia: Yes: Rubra


Lochia, amount: Small


Extremities: Yes: Calves non-tender


Perineum: Yes: Intact


Activity: Ambulating





- Labs


Labs: 


 CBC











WBC  9.5 K/mm3 (4.0-10.0)   10/02/18  07:00    


 


RBC  3.32 M/mm3 (3.60-5.2)  L  10/02/18  07:00    


 


Hgb  10.2 GM/dL (10.7-15.3)  L  10/02/18  07:00    


 


Hct  30.4 % (32.4-45.2)  L D 10/02/18  07:00    


 


MCV  91.6 fl (80-96)   10/02/18  07:00    


 


MCH  30.7 pg (25.7-33.7)   10/02/18  07:00    


 


MCHC  33.4 g/dl (32.0-36.0)   10/02/18  07:00    


 


RDW  14.6 % (11.6-15.6)   10/02/18  07:00    


 


Plt Count  134 K/MM3 (134-434)   10/02/18  07:00    


 


MPV  9.2 fl (7.5-11.1)   10/02/18  07:00    


 


Absolute Neuts (auto)  7.9 K/mm3 (1.5-8.0)   10/02/18  07:00    


 


Neutrophils %  83.2 % (42.8-82.8)  H  10/02/18  07:00    


 


Lymphocytes %  7.7 % (8-40)  L D 10/02/18  07:00    


 


Monocytes %  7.0 % (3.8-10.2)   10/02/18  07:00    


 


Eosinophils %  1.9 % (0-4.5)  D 10/02/18  07:00    


 


Basophils %  0.2 % (0-2.0)   10/02/18  07:00    


 


Nucleated RBC %  0 % (0-0)   10/02/18  07:00    














Assessment/Plan





34yo P1 s/p high Transverse C/S and multiple fibroids, doing well stable, 

afebrile.


Asymptomatic for anemia


Postpartum care instructions reviewed.


Continue routine postop care.


Ambulation encouraged.

## 2018-10-03 NOTE — DS
Physical Exam-GYN


Vital Signs: 


 Vital Signs











Temperature  98.4 F   10/02/18 21:00


 


Pulse Rate  81   10/02/18 21:00


 


Respiratory Rate  20   10/02/18 21:00


 


Blood Pressure  112/58 L  10/02/18 21:00


 


O2 Sat by Pulse Oximetry (%)  100   10/01/18 13:15











Constitutional: Yes: Well Nourished, No Distress, Calm


Eyes: Yes: WNL, Conjunctiva Clear


HENT: Yes: WNL, Atraumatic, Normocephalic


Neck: Yes: WNL, Supple, Trachea Midline


Cardiovascular: Yes: WNL, Regular Rate and Rhythm


Respiratory: Yes: WNL, Regular, CTA Bilaterally


Gastrointestinal: Yes: WNL, Normal Bowel Sounds, Soft


...Rectal Exam: Yes: Deferred


Renal/: Yes: WNL


Internal Exam Deferred: Yes


....Post Partum: Yes: Uterus firm, Uterus non-tender, Slight lochia rubra


Breast(s): Yes: WNL


Musculoskeletal: Yes: WNL


Extremities: Yes: WNL


Edema: No


Edema: LLE: Trace, RLE: Trace


Integumentary: Yes: WNL


Wound/Incision: Yes: Clean/Dry, Well Approximated, Sutures Intact, Steri Strips

, Open to air


Neurological: Yes: WNL, Alert, Oriented


...Motor Strength: WNL


Psychiatric: Yes: WNL, Alert, Oriented


Labs: 


 CBC, BMP





 10/02/18 07:00 











Delivery





- Delivery


 Section: Primary, High Transverse


Type of Anesthesia: Epidural


Episiotomy/Laceration: None


EBL (cc): 1,000





Delivery, Single Birth





- Stages of Labor


Date of Delivery: 10/01/18


Time of Delivery: 10:59


Time Placenta Delivered: 11:00


Placenta: Yes: Manual Removal, Normal Configuration





- Condition of Infant


Pediatrician/Neonatologist Present: Yes


Name: Kacey Lo


Infant Gender: Male


Birth Weight: 3.317 kg


Position: Left, OT


Total Hours ROM (Hrs/Mins): 2mins





- Apgar


  ** 1 Minute


Apgar Total Score: 9





  ** 5 Minutes


Apgar Total Score: 9





- [place] Feeding Plan


Initial Plan: Exclusive breastfeeding throughout hospitalization





Discharge Summary


Reason For Visit: 





Pregnancy at EGA 39w3d


Unstable fetal lie


Uterine fibroids


Procedures: Principal: Primary high transverse C/S


Other Procedures: Myomectomy.  Excision of left paratubal cyst


Hospital Course: 





Normal recovery


Condition: Good





- Instructions


Diet, Activity, Other Instructions: 





Physical activity





Resume your normal everyday activity as tolerated no heavy lifting or exercise 

until seen by your surgeon. You may walk unlimited leander of and climb stairs. 

You may resume driving the car when you feel safe and comfortable behind the 

wheel. No sexual activity as instructed.





Wound care


If you have a bandage, leave it on, and keep dry for 48-72 hours. After that 

time discard the outer bandage. If they are tapes on the skin under the out of 

bandage leave them in place. They will peel off in the next 7 to 10 days. Do 

Not Peel them off. You may shower the day after surgery. If there are tapes 

present on the skin, you may shower over them.





Diet


There are no dietary restrictions. Eat healthy, high-fiber foods. Drink 6 to 8 

glasses of liquid each day. This will assist in keeping your bowels are regular.





Pain management


You may take Tylenol or acetaminophen or Ibuprofen (for example, Motrin, Advil 

etc.) from my pain prescription medication is ordered should be taken as 

prescribed for moderate to severe pain.





Call MD for any of the following:





Severe pain not relieved by medication


Fever of 101 or higher


Excessive bleeding or drainage on dressing


Inability to urinate

















Referrals: 


Mynor Feldman MD [Staff Physician] - 


Disposition: HOME





- Home Medications


Comprehensive Discharge Medication List: 


Ambulatory Orders





Prenatal Vitamins (Sjr) - 1 tab PO DAILY 18

## 2018-10-04 LAB
BASOPHILS # BLD: 0.3 % (ref 0–2)
DEPRECATED RDW RBC AUTO: 14.5 % (ref 11.6–15.6)
EOSINOPHIL # BLD: 3.2 % (ref 0–4.5)
HCT VFR BLD CALC: 26 % (ref 32.4–45.2)
HGB BLD-MCNC: 8.8 GM/DL (ref 10.7–15.3)
LYMPHOCYTES # BLD: 16.1 % (ref 8–40)
MCH RBC QN AUTO: 30.6 PG (ref 25.7–33.7)
MCHC RBC AUTO-ENTMCNC: 33.9 G/DL (ref 32–36)
MCV RBC: 90.4 FL (ref 80–96)
MONOCYTES # BLD AUTO: 7.2 % (ref 3.8–10.2)
NEUTROPHILS # BLD: 73.2 % (ref 42.8–82.8)
PLATELET # BLD AUTO: 136 K/MM3 (ref 134–434)
PMV BLD: 8.9 FL (ref 7.5–11.1)
RBC # BLD AUTO: 2.87 M/MM3 (ref 3.6–5.2)
WBC # BLD AUTO: 6.1 K/MM3 (ref 4–10)

## 2018-10-04 RX ADMIN — IBUPROFEN PRN MG: 600 TABLET, FILM COATED ORAL at 20:58

## 2018-10-04 RX ADMIN — SIMETHICONE CHEW TAB 80 MG PRN MG: 80 TABLET ORAL at 20:57

## 2018-10-04 RX ADMIN — ACETAMINOPHEN PRN MG: 325 TABLET ORAL at 02:37

## 2018-10-04 RX ADMIN — ACETAMINOPHEN PRN MG: 325 TABLET ORAL at 20:57

## 2018-10-04 RX ADMIN — Medication SCH TAB: at 09:48

## 2018-10-04 RX ADMIN — IBUPROFEN PRN MG: 600 TABLET, FILM COATED ORAL at 02:36

## 2018-10-04 RX ADMIN — SIMETHICONE CHEW TAB 80 MG PRN MG: 80 TABLET ORAL at 12:02

## 2018-10-04 RX ADMIN — ACETAMINOPHEN PRN MG: 325 TABLET ORAL at 12:03

## 2018-10-04 RX ADMIN — IBUPROFEN PRN MG: 600 TABLET, FILM COATED ORAL at 12:02

## 2018-10-04 RX ADMIN — SIMETHICONE CHEW TAB 80 MG PRN MG: 80 TABLET ORAL at 02:37

## 2018-10-04 NOTE — PATH
Surgical Pathology Report



Patient Name:  DEEPIKA TOPETE

Accession #:  [id number]

[facility]. Rec. #:  [id number]                                                        

   /Age/Gender:  1985 (Age: 33) / F

Account:  [id number]                                                          

             Location: [facility] OBS/GYN

Taken:  10/1/2018

Received:  10/1/2018

Reported:  10/4/2018

Physicians:  Mynor Feldman M.D.

  



Specimen(s) Received

A: FIBROID 

B: LEFT TUBAL CYST 





Clinical History

, fibroid tumor 







Final Diagnosis

A. FIBROID, EXCISION:

LEIOMYOMA WITH DEGENERATIVE CHANGE.



B. LEFT TUBAL CYST, EXCISION:

PARATUBAL CYST.







***Electronically Signed***

Lili Pinzon M.D.





Gross Description

A.  Received in formalin, labeled "fibroids", is a nodule measuring 3 x 2 x 1.8

cm, weighs 13 grams. Serial section reveals solid, tan cut surfaces with central

softening. No hemorrhage is seen grossly. Representative sections are submitted

in three cassettes.



B.  Received in formalin, labeled "left tube cyst", is a thin-walled cyst with

serous fluid measuring 2.3 cm in greatest dimension. The specimen is bisected

and entirely submitted in one cassette.

__

MAMADOU/10/1/2018



thea/10/1/2018

## 2018-10-04 NOTE — PN
Post Partum Progress Note





- Subjective


Subjective: 





Patient without acute complaints. 


Reports tolerating oral intake without nausea or vomiting. 


Ambulating without dizziness. 


Denies fevers or chills.


Pain well controlled with oral pain medication.


Breastfeeding without difficulty.


Passing flatus.


Post Partum Day: 3


Type of Delivery: Primary C/S


Vital Signs: 


 Vital Signs











Temperature  98.5 F   10/03/18 22:32


 


Pulse Rate  79   10/03/18 22:32


 


Respiratory Rate  20   10/03/18 22:32


 


Blood Pressure  105/50 L  10/03/18 22:32


 


O2 Sat by Pulse Oximetry (%)  100   10/01/18 13:15











Breast Exam: Yes: Engorged, Other (engorgement R axilla, ectopic breast tissue)


Uterus: Yes: Fundus Firm, Fundus above umbilicus (+ fibroid)


Incision: Yes: Sutures intact.  No: Redness, Oozing


Abdomen/GI: Yes: Abdomen soft, Tender (mild incisional), Passing flatus, 

Tolerating PO.  No: Abdominal Distention


Lochia: Yes: Serosa


Lochia, amount: Small


Extremities: Yes: Calves non-tender.  No: Edema


Activity: Ambulating





- Labs


Labs: 


 CBC











WBC  6.1 K/mm3 (4.0-10.0)   10/04/18  06:55    


 


RBC  2.87 M/mm3 (3.60-5.2)  L  10/04/18  06:55    


 


Hgb  8.8 GM/dL (10.7-15.3)  L  10/04/18  06:55    


 


Hct  26.0 % (32.4-45.2)  L  10/04/18  06:55    


 


MCV  90.4 fl (80-96)   10/04/18  06:55    


 


MCH  30.6 pg (25.7-33.7)   10/04/18  06:55    


 


MCHC  33.9 g/dl (32.0-36.0)   10/04/18  06:55    


 


RDW  14.5 % (11.6-15.6)   10/04/18  06:55    


 


Plt Count  136 K/MM3 (134-434)   10/04/18  06:55    


 


MPV  8.9 fl (7.5-11.1)   10/04/18  06:55    


 


Absolute Neuts (auto)  4.5 K/mm3 (1.5-8.0)   10/04/18  06:55    


 


Neutrophils %  73.2 % (42.8-82.8)   10/04/18  06:55    


 


Lymphocytes %  16.1 % (8-40)  D 10/04/18  06:55    


 


Monocytes %  7.2 % (3.8-10.2)   10/04/18  06:55    


 


Eosinophils %  3.2 % (0-4.5)   10/04/18  06:55    


 


Basophils %  0.3 % (0-2.0)   10/04/18  06:55    


 


Nucleated RBC %  0 % (0-0)   10/04/18  06:55    














Assessment/Plan





32 yo POD # 3 s/p CD, mild asymptomatic anemia, afebrile, vital signs stable, 

doing well 


1. Continue routine postoperative  care. 


2. Encourage ambulation and incentive spirometer use


3. Continue oral pain medication


4. Encourage ice packs to R axilla


Breastfeeding support given 


5. Anticipate discharge home postoperative day #4

## 2018-10-05 VITALS — SYSTOLIC BLOOD PRESSURE: 119 MMHG | DIASTOLIC BLOOD PRESSURE: 70 MMHG | TEMPERATURE: 98.4 F | HEART RATE: 69 BPM

## 2018-10-05 RX ADMIN — IBUPROFEN PRN MG: 600 TABLET, FILM COATED ORAL at 09:42

## 2018-10-05 RX ADMIN — SIMETHICONE CHEW TAB 80 MG PRN MG: 80 TABLET ORAL at 09:42

## 2018-10-05 RX ADMIN — ACETAMINOPHEN PRN MG: 325 TABLET ORAL at 09:42

## 2018-10-05 RX ADMIN — Medication SCH TAB: at 09:42

## 2018-10-05 NOTE — PN
Progress Note (short form)





- Note


Progress Note: 





pod 4 ,doing well, no c/o . no excess vaginal bleeding, had BM, no dizziness


 CBC, BMP





 10/04/18 06:55 





abdomen soft, no distension, no cva 


uterus firm, non tender 


incision dry, clean , healing well


no calf tenderness 


plan ambulate 


d/c home, follow up office 1 week

## 2021-05-20 ENCOUNTER — HOSPITAL ENCOUNTER (EMERGENCY)
Dept: HOSPITAL 74 - JER | Age: 36
Discharge: HOME | End: 2021-05-20
Payer: COMMERCIAL

## 2021-05-20 VITALS — BODY MASS INDEX: 25.8 KG/M2

## 2021-05-20 VITALS — HEART RATE: 112 BPM | SYSTOLIC BLOOD PRESSURE: 100 MMHG | DIASTOLIC BLOOD PRESSURE: 64 MMHG | TEMPERATURE: 99 F

## 2021-05-20 DIAGNOSIS — D25.9: Primary | ICD-10-CM

## 2021-05-20 LAB
ALBUMIN SERPL-MCNC: 3.8 G/DL (ref 3.4–5)
ALP SERPL-CCNC: 62 U/L (ref 45–117)
ALT SERPL-CCNC: 11 U/L (ref 13–61)
ANION GAP SERPL CALC-SCNC: 5 MMOL/L (ref 8–16)
APPEARANCE UR: CLEAR
AST SERPL-CCNC: 35 U/L (ref 15–37)
BACTERIA # UR AUTO: 201 /UL (ref 0–1359)
BASOPHILS # BLD: 0.3 % (ref 0–2)
BILIRUB SERPL-MCNC: 0.9 MG/DL (ref 0.2–1)
BILIRUB UR STRIP.AUTO-MCNC: NEGATIVE MG/DL
BUN SERPL-MCNC: 8.6 MG/DL (ref 7–18)
CALCIUM SERPL-MCNC: 8.9 MG/DL (ref 8.5–10.1)
CASTS URNS QL MICRO: 1 /UL (ref 0–3.1)
CHLORIDE SERPL-SCNC: 106 MMOL/L (ref 98–107)
CO2 SERPL-SCNC: 27 MMOL/L (ref 21–32)
COLOR UR: YELLOW
CREAT SERPL-MCNC: 0.8 MG/DL (ref 0.55–1.3)
DEPRECATED RDW RBC AUTO: 17.5 % (ref 11.6–15.6)
EOSINOPHIL # BLD: 0 % (ref 0–4.5)
EPITH CASTS URNS QL MICRO: 12 /UL (ref 0–25.1)
GLUCOSE SERPL-MCNC: 103 MG/DL (ref 74–106)
HCT VFR BLD CALC: 31.3 % (ref 32.4–45.2)
HGB BLD-MCNC: 10.3 GM/DL (ref 10.7–15.3)
KETONES UR QL STRIP: (no result)
LEUKOCYTE ESTERASE UR QL STRIP.AUTO: NEGATIVE
LYMPHOCYTES # BLD: 7.2 % (ref 8–40)
MCH RBC QN AUTO: 27.3 PG (ref 25.7–33.7)
MCHC RBC AUTO-ENTMCNC: 33 G/DL (ref 32–36)
MCV RBC: 82.6 FL (ref 80–96)
MONOCYTES # BLD AUTO: 11.6 % (ref 3.8–10.2)
NEUTROPHILS # BLD: 80.9 % (ref 42.8–82.8)
NITRITE UR QL STRIP: NEGATIVE
PH UR: 5.5 [PH] (ref 5–8)
PLATELET # BLD AUTO: 212 K/MM3 (ref 134–434)
PMV BLD: 8.7 FL (ref 7.5–11.1)
PROT SERPL-MCNC: 7.8 G/DL (ref 6.4–8.2)
PROT UR QL STRIP: NEGATIVE
PROT UR QL STRIP: NEGATIVE
RBC # BLD AUTO: 3.78 M/MM3 (ref 3.6–5.2)
RBC # BLD AUTO: 4 /UL (ref 0–23.9)
SODIUM SERPL-SCNC: 137 MMOL/L (ref 136–145)
SP GR UR: 1.02 (ref 1.01–1.03)
UROBILINOGEN UR STRIP-MCNC: 0.2 MG/DL (ref 0.2–1)
WBC # BLD AUTO: 10.7 K/MM3 (ref 4–10)
WBC # UR AUTO: 7 /UL (ref 0–25.8)

## 2021-05-20 PROCEDURE — 3E033NZ INTRODUCTION OF ANALGESICS, HYPNOTICS, SEDATIVES INTO PERIPHERAL VEIN, PERCUTANEOUS APPROACH: ICD-10-PCS

## 2021-05-20 PROCEDURE — 3E0333Z INTRODUCTION OF ANTI-INFLAMMATORY INTO PERIPHERAL VEIN, PERCUTANEOUS APPROACH: ICD-10-PCS
